# Patient Record
Sex: FEMALE | Race: BLACK OR AFRICAN AMERICAN | NOT HISPANIC OR LATINO | Employment: FULL TIME | URBAN - METROPOLITAN AREA
[De-identification: names, ages, dates, MRNs, and addresses within clinical notes are randomized per-mention and may not be internally consistent; named-entity substitution may affect disease eponyms.]

---

## 2023-06-28 ENCOUNTER — OFFICE VISIT (OUTPATIENT)
Dept: URGENT CARE | Facility: CLINIC | Age: 43
End: 2023-06-28
Payer: COMMERCIAL

## 2023-06-28 VITALS
HEART RATE: 77 BPM | SYSTOLIC BLOOD PRESSURE: 130 MMHG | DIASTOLIC BLOOD PRESSURE: 78 MMHG | WEIGHT: 290 LBS | RESPIRATION RATE: 20 BRPM | TEMPERATURE: 97 F | OXYGEN SATURATION: 98 % | HEIGHT: 67 IN | BODY MASS INDEX: 45.52 KG/M2

## 2023-06-28 DIAGNOSIS — M79.671 RIGHT FOOT PAIN: Primary | ICD-10-CM

## 2023-06-28 PROCEDURE — 96372 PR INJECTION,THERAP/PROPH/DIAG2ST, IM OR SUBCUT: ICD-10-PCS | Mod: S$GLB,,, | Performed by: NURSE PRACTITIONER

## 2023-06-28 PROCEDURE — 73630 X-RAY EXAM OF FOOT: CPT | Mod: RT,S$GLB,, | Performed by: RADIOLOGY

## 2023-06-28 PROCEDURE — 99203 OFFICE O/P NEW LOW 30 MIN: CPT | Mod: 25,S$GLB,, | Performed by: NURSE PRACTITIONER

## 2023-06-28 PROCEDURE — 99203 PR OFFICE/OUTPT VISIT, NEW, LEVL III, 30-44 MIN: ICD-10-PCS | Mod: 25,S$GLB,, | Performed by: NURSE PRACTITIONER

## 2023-06-28 PROCEDURE — 73630 XR FOOT COMPLETE 3 VIEW RIGHT: ICD-10-PCS | Mod: RT,S$GLB,, | Performed by: RADIOLOGY

## 2023-06-28 PROCEDURE — 96372 THER/PROPH/DIAG INJ SC/IM: CPT | Mod: S$GLB,,, | Performed by: NURSE PRACTITIONER

## 2023-06-28 RX ORDER — HYDROCHLOROTHIAZIDE 25 MG/1
25 TABLET ORAL
COMMUNITY
Start: 2023-06-15

## 2023-06-28 RX ORDER — GABAPENTIN 100 MG/1
100 CAPSULE ORAL 2 TIMES DAILY
COMMUNITY
Start: 2023-06-17

## 2023-06-28 RX ORDER — LOSARTAN POTASSIUM 100 MG/1
100 TABLET ORAL
COMMUNITY
Start: 2023-06-15

## 2023-06-28 RX ORDER — METHYLPREDNISOLONE 4 MG/1
TABLET ORAL
Qty: 21 EACH | Refills: 0 | Status: SHIPPED | OUTPATIENT
Start: 2023-06-28 | End: 2023-07-19

## 2023-06-28 RX ORDER — KETOROLAC TROMETHAMINE 30 MG/ML
30 INJECTION, SOLUTION INTRAMUSCULAR; INTRAVENOUS
Status: COMPLETED | OUTPATIENT
Start: 2023-06-28 | End: 2023-06-28

## 2023-06-28 RX ORDER — AMLODIPINE BESYLATE 10 MG/1
10 TABLET ORAL
COMMUNITY
Start: 2023-04-07

## 2023-06-28 RX ADMIN — KETOROLAC TROMETHAMINE 30 MG: 30 INJECTION, SOLUTION INTRAMUSCULAR; INTRAVENOUS at 09:06

## 2023-06-28 NOTE — PATIENT INSTRUCTIONS
Please drink plenty of fluids.  Please get plenty of rest.  Please return here or go to the Emergency Department for any concerns or worsening of condition.  If you were prescribed a narcotic medication, do not drive or operate heavy equipment or machinery while taking these medications.  If you were not prescribed an anti-inflammatory medication, and if you do not have any history of stomach/intestinal ulcers, or kidney disease, or are not taking a blood thinner such as Coumadin, Plavix, Pradaxa, Eloquis, or Xaralta for example, it is OK to take over the counter Ibuprofen or Advil or Motrin or Aleve as directed.  Do not take these medications on an empty stomach.  Rest, ice, compression and elevation to the affected joint or limb as needed.  Please follow up with your primary care doctor or specialist as needed.  Switch shoes to better support.    If you  smoke, please stop smoking.

## 2023-06-28 NOTE — PROGRESS NOTES
"Subjective:      Patient ID: Colleen Nagy is a 42 y.o. female.    Vitals:  height is 5' 7" (1.702 m) and weight is 131.5 kg (290 lb). Her oral temperature is 97.2 °F (36.2 °C). Her blood pressure is 130/78 and her pulse is 77. Her respiration is 20 and oxygen saturation is 98%.     Chief Complaint: Foot Injury    42 y.o female c/o right foot pain started x 2weeks ago. Patient states that she has pain on the side of foot that she unable to keep shoes on for a long period of time.Patient states that when walking she feels pain in her toes. Tylenol taking for pain. Last taking at 9 pm last night.     Foot Injury   The incident occurred more than 1 week ago. The incident occurred at home. The injury mechanism is unknown. The pain is present in the right foot. The quality of the pain is described as aching. The pain is at a severity of 5/10. The pain is moderate. The pain has been Constant since onset. Pertinent negatives include no inability to bear weight, loss of motion, loss of sensation, muscle weakness, numbness or tingling. She reports no foreign bodies present. The symptoms are aggravated by movement. She has tried acetaminophen for the symptoms. The treatment provided mild relief.     Constitution: Negative for fever.   Musculoskeletal:  Positive for pain.   Skin:  Negative for rash, wound, abrasion and erythema.   Neurological:  Negative for numbness.    Objective:     Physical Exam   Constitutional: She is oriented to person, place, and time. She appears well-developed.  Non-toxic appearance. She does not appear ill. No distress.   HENT:   Head: Normocephalic and atraumatic. Head is without abrasion, without contusion and without laceration.   Ears:   Right Ear: External ear normal.   Left Ear: External ear normal.   Nose: Nose normal.   Mouth/Throat: Oropharynx is clear and moist and mucous membranes are normal.   Eyes: Conjunctivae, EOM and lids are normal. Pupils are equal, round, and reactive to " light.   Neck: Trachea normal and phonation normal. Neck supple.   Cardiovascular: Normal rate, regular rhythm, normal heart sounds and normal pulses.   Pulmonary/Chest: Effort normal and breath sounds normal. No stridor. No respiratory distress.   Musculoskeletal: Normal range of motion.         General: Normal range of motion.      Right ankle: Normal. Achilles tendon normal.      Right foot: Normal range of motion and normal capillary refill. Tenderness present. No bony tenderness, swelling, crepitus, deformity, laceration, bunion, Charcot foot, foot drop or prominent metatarsal heads.      Comments: TTP to dorsal aspect of right foot near MTP and into the MT region of adjacent toes.  No swelling, redness, or warmth.  +2 pulses.     Neurological: She is alert and oriented to person, place, and time.   Skin: Skin is warm, dry, intact, not diaphoretic and no rash. Capillary refill takes less than 2 seconds. not right footNo abrasion, No burn, No bruising, No erythema and No ecchymosis   Psychiatric: Her speech is normal and behavior is normal. Judgment and thought content normal.   Nursing note and vitals reviewed.    Assessment:     1. Right foot pain        Plan:     No hx of gout.  No recent foods or alcohol that could cause gout.  She notes that the pain is worse when she flexes the toes.    She's tried tylenol w/ no relief.  She does not like NSAIDs.  Will try medrol dose marialuisa and changing shoes.  F/u with podiatry if symptoms persist.    XR FOOT COMPLETE 3 VIEW RIGHT    Result Date: 6/28/2023  EXAMINATION: XR FOOT COMPLETE 3 VIEW RIGHT CLINICAL HISTORY: . Pain in right foot TECHNIQUE: AP, lateral, and oblique views of the right foot were performed. COMPARISON: None FINDINGS: No evidence of acute fracture or dislocation.  Joint spaces appear maintained.  Mild DJD of the great toe MTP joint.  No evidence of radiopaque foreign body. Minor degenerative spurring dorsal midfoot.  Mild enthesopathic change at the  calcaneus.     No convincing evidence of acute fracture or dislocation. Electronically signed by: Rupert Solo Date:    06/28/2023 Time:    09:21     Right foot pain  -     XR FOOT COMPLETE 3 VIEW RIGHT; Future; Expected date: 06/28/2023  -     ketorolac injection 30 mg  -     methylPREDNISolone (MEDROL DOSEPACK) 4 mg tablet; use as directed  Dispense: 21 each; Refill: 0        Patient Instructions   Please drink plenty of fluids.  Please get plenty of rest.  Please return here or go to the Emergency Department for any concerns or worsening of condition.  If you were prescribed a narcotic medication, do not drive or operate heavy equipment or machinery while taking these medications.  If you were not prescribed an anti-inflammatory medication, and if you do not have any history of stomach/intestinal ulcers, or kidney disease, or are not taking a blood thinner such as Coumadin, Plavix, Pradaxa, Eloquis, or Xaralta for example, it is OK to take over the counter Ibuprofen or Advil or Motrin or Aleve as directed.  Do not take these medications on an empty stomach.  Rest, ice, compression and elevation to the affected joint or limb as needed.  Please follow up with your primary care doctor or specialist as needed.  Switch shoes to better support.    If you  smoke, please stop smoking.

## 2024-09-21 ENCOUNTER — OFFICE VISIT (OUTPATIENT)
Dept: URGENT CARE | Facility: CLINIC | Age: 44
End: 2024-09-21
Payer: COMMERCIAL

## 2024-09-21 VITALS
DIASTOLIC BLOOD PRESSURE: 60 MMHG | SYSTOLIC BLOOD PRESSURE: 111 MMHG | BODY MASS INDEX: 45.52 KG/M2 | OXYGEN SATURATION: 98 % | RESPIRATION RATE: 20 BRPM | HEIGHT: 67 IN | TEMPERATURE: 99 F | HEART RATE: 82 BPM | WEIGHT: 290 LBS

## 2024-09-21 DIAGNOSIS — M54.41 ACUTE RIGHT-SIDED LOW BACK PAIN WITH RIGHT-SIDED SCIATICA: Primary | ICD-10-CM

## 2024-09-21 DIAGNOSIS — M25.551 PAIN OF RIGHT HIP: ICD-10-CM

## 2024-09-21 PROCEDURE — 72100 X-RAY EXAM L-S SPINE 2/3 VWS: CPT | Mod: S$GLB,,, | Performed by: RADIOLOGY

## 2024-09-21 PROCEDURE — 96372 THER/PROPH/DIAG INJ SC/IM: CPT | Mod: S$GLB,,, | Performed by: FAMILY MEDICINE

## 2024-09-21 PROCEDURE — 99214 OFFICE O/P EST MOD 30 MIN: CPT | Mod: 25,S$GLB,, | Performed by: NURSE PRACTITIONER

## 2024-09-21 RX ORDER — KETOROLAC TROMETHAMINE 30 MG/ML
30 INJECTION, SOLUTION INTRAMUSCULAR; INTRAVENOUS
Status: COMPLETED | OUTPATIENT
Start: 2024-09-21 | End: 2024-09-21

## 2024-09-21 RX ORDER — LATANOPROST 50 UG/ML
1 SOLUTION/ DROPS OPHTHALMIC NIGHTLY
COMMUNITY
Start: 2024-07-16

## 2024-09-21 RX ORDER — METFORMIN HYDROCHLORIDE 500 MG/1
1 TABLET, EXTENDED RELEASE ORAL NIGHTLY
COMMUNITY
Start: 2024-07-30

## 2024-09-21 RX ORDER — PREDNISONE 20 MG/1
40 TABLET ORAL DAILY
Qty: 10 TABLET | Refills: 0 | Status: SHIPPED | OUTPATIENT
Start: 2024-09-21 | End: 2024-09-26

## 2024-09-21 RX ADMIN — KETOROLAC TROMETHAMINE 30 MG: 30 INJECTION, SOLUTION INTRAMUSCULAR; INTRAVENOUS at 10:09

## 2024-09-21 NOTE — PROGRESS NOTES
"Subjective:      Patient ID: Colleen Nagy is a 43 y.o. female.    Vitals:  height is 5' 7" (1.702 m) and weight is 131.5 kg (290 lb). Her oral temperature is 98.6 °F (37 °C). Her blood pressure is 111/60 and her pulse is 82. Her respiration is 20 and oxygen saturation is 98%.     Chief Complaint: Hip Pain    Pt is a 43 year old female c/o right hip pain that radiates down to leg that started 2 weeks ago.  No trauma. Patient tried OTC Tylenol, naproxen, & Biofreeze with mild relief.  She has constant pain, but it is worse w/ ROM and sitting, standing, laying down. The area is TTP.     Hip Pain   The incident occurred more than 1 week ago. The incident occurred at home. There was no injury mechanism. The pain is present in the right hip and right leg. The quality of the pain is described as shooting. The pain is at a severity of 7/10. The pain is severe. The pain has been Constant since onset. Associated symptoms include tingling. Pertinent negatives include no inability to bear weight, loss of motion, loss of sensation, muscle weakness or numbness. She reports no foreign bodies present. The symptoms are aggravated by movement and weight bearing. She has tried acetaminophen and NSAIDs for the symptoms. The treatment provided mild relief.       Neurological:  Negative for numbness.      Objective:     Physical Exam   Constitutional: She is oriented to person, place, and time.   HENT:   Head: Normocephalic.   Ears:   Right Ear: External ear normal.   Left Ear: External ear normal.   Nose: Nose normal.   Mouth/Throat: Mucous membranes are moist.   Eyes: Conjunctivae are normal.   Cardiovascular: Normal rate.   Pulmonary/Chest: Effort normal.   Musculoskeletal:      Lumbar back: She exhibits decreased range of motion and tenderness. She exhibits no swelling.        Back:    Neurological: She is alert and oriented to person, place, and time.   Skin: Skin is dry.   Psychiatric: Her behavior is normal.   Nursing " note and vitals reviewed.      Assessment:     1. Acute right-sided low back pain with right-sided sciatica    2. Pain of right hip        Plan:       Acute right-sided low back pain with right-sided sciatica  -     ketorolac injection 30 mg  -     predniSONE (DELTASONE) 20 MG tablet; Take 2 tablets (40 mg total) by mouth once daily. for 5 days  Dispense: 10 tablet; Refill: 0  -     Ambulatory referral/consult to Orthopedics  -     XR LUMBAR SPINE 2 OR 3 VIEWS; Future; Expected date: 09/21/2024    Pain of right hip  -     Ambulatory referral/consult to Orthopedics  -     XR LUMBAR SPINE 2 OR 3 VIEWS; Future; Expected date: 09/21/2024      Patient Instructions   - You must understand that you have received an Urgent Care treatment only and that you may be released before all of your medical problems are known or treated.   - You, the patient, will arrange for follow up care as instructed.   - If your condition worsens or fails to improve we recommend that you receive another evaluation at the ER immediately or contact your PCP to discuss your concerns.   - You can call [phone] or [phone] to help schedule an appointment with the appropriate provider.    Take OTC ibuprofen 400-800 mg 3 times per day. Max dose 3200 mg from all sources per day. Or Tylenol 500-1000 mg 3 times per day. Max 4000 mg from all sources per day.   Rest as much as possible  Alternate heat and ice. Apply heat for 20-30 minutes, perform gentle range of motion exercises, and then apply ice for 15 minutes. Do this 3-4 times per day.    If you received an injection of toradol in the clinic today, DO NOT take any anti-inflammatory medications today. These include: Advil/Motrin (ibuprofen), Aleve (naproxen), Mobic (meloxicam).

## 2024-09-21 NOTE — PATIENT INSTRUCTIONS
- You must understand that you have received an Urgent Care treatment only and that you may be released before all of your medical problems are known or treated.   - You, the patient, will arrange for follow up care as instructed.   - If your condition worsens or fails to improve we recommend that you receive another evaluation at the ER immediately or contact your PCP to discuss your concerns.   - You can call [phone] or [phone] to help schedule an appointment with the appropriate provider.    Take OTC ibuprofen 400-800 mg 3 times per day. Max dose 3200 mg from all sources per day. Or Tylenol 500-1000 mg 3 times per day. Max 4000 mg from all sources per day.   Rest as much as possible  Alternate heat and ice. Apply heat for 20-30 minutes, perform gentle range of motion exercises, and then apply ice for 15 minutes. Do this 3-4 times per day.    If you received an injection of toradol in the clinic today, DO NOT take any anti-inflammatory medications today. These include: Advil/Motrin (ibuprofen), Aleve (naproxen), Mobic (meloxicam).

## 2024-09-23 ENCOUNTER — OFFICE VISIT (OUTPATIENT)
Dept: URGENT CARE | Facility: CLINIC | Age: 44
End: 2024-09-23
Payer: COMMERCIAL

## 2024-09-23 ENCOUNTER — TELEPHONE (OUTPATIENT)
Dept: SPORTS MEDICINE | Facility: CLINIC | Age: 44
End: 2024-09-23
Payer: COMMERCIAL

## 2024-09-23 VITALS
RESPIRATION RATE: 20 BRPM | HEART RATE: 90 BPM | WEIGHT: 290 LBS | HEIGHT: 67 IN | BODY MASS INDEX: 45.52 KG/M2 | DIASTOLIC BLOOD PRESSURE: 90 MMHG | SYSTOLIC BLOOD PRESSURE: 141 MMHG | TEMPERATURE: 100 F | OXYGEN SATURATION: 97 %

## 2024-09-23 DIAGNOSIS — S20.219A CONTUSION OF CHEST WALL, UNSPECIFIED LATERALITY, INITIAL ENCOUNTER: Primary | ICD-10-CM
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
## 2024-09-23 PROCEDURE — 93005 ELECTROCARDIOGRAM TRACING: CPT | Mod: S$GLB,,, | Performed by: FAMILY MEDICINE

## 2024-09-23 PROCEDURE — 93010 ELECTROCARDIOGRAM REPORT: CPT | Mod: S$GLB,,, | Performed by: INTERNAL MEDICINE

## 2024-09-23 PROCEDURE — 96372 THER/PROPH/DIAG INJ SC/IM: CPT | Mod: S$GLB,,, | Performed by: FAMILY MEDICINE

## 2024-09-23 PROCEDURE — 99213 OFFICE O/P EST LOW 20 MIN: CPT | Mod: 25,S$GLB,, | Performed by: FAMILY MEDICINE

## 2024-09-23 PROCEDURE — 71046 X-RAY EXAM CHEST 2 VIEWS: CPT | Mod: S$GLB,,, | Performed by: RADIOLOGY

## 2024-09-23 RX ORDER — IBUPROFEN 600 MG/1
600 TABLET ORAL EVERY 8 HOURS PRN
Qty: 30 TABLET | Refills: 0 | Status: SHIPPED | OUTPATIENT
Start: 2024-09-23

## 2024-09-23 RX ORDER — KETOROLAC TROMETHAMINE 30 MG/ML
30 INJECTION, SOLUTION INTRAMUSCULAR; INTRAVENOUS
Status: COMPLETED | OUTPATIENT
Start: 2024-09-23 | End: 2024-09-23

## 2024-09-23 RX ADMIN — KETOROLAC TROMETHAMINE 30 MG: 30 INJECTION, SOLUTION INTRAMUSCULAR; INTRAVENOUS at 07:09

## 2024-09-23 NOTE — TELEPHONE ENCOUNTER
Called pt regarding appt scheduled on 9/24, left VM requesting return phone call to reschedule her to see a provider in [facility].     Roseanne Bates MS, OTC  Clinical Assistant to Dr. Eden Levy
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
## 2024-09-24 LAB
OHS QRS DURATION: 72 MS
OHS QTC CALCULATION: 430 MS

## 2024-09-26 ENCOUNTER — OFFICE VISIT (OUTPATIENT)
Dept: SPINE | Facility: CLINIC | Age: 44
End: 2024-09-26
Payer: COMMERCIAL

## 2024-09-26 VITALS
HEIGHT: 67 IN | BODY MASS INDEX: 45.52 KG/M2 | DIASTOLIC BLOOD PRESSURE: 59 MMHG | HEART RATE: 80 BPM | WEIGHT: 290 LBS | SYSTOLIC BLOOD PRESSURE: 119 MMHG | RESPIRATION RATE: 18 BRPM

## 2024-09-26 DIAGNOSIS — M79.10 MYALGIA: ICD-10-CM

## 2024-09-26 DIAGNOSIS — M79.18 MYOFASCIAL PAIN SYNDROME: ICD-10-CM

## 2024-09-26 DIAGNOSIS — G57.01 PIRIFORMIS SYNDROME OF RIGHT SIDE: Primary | ICD-10-CM

## 2024-09-26 PROCEDURE — 99204 OFFICE O/P NEW MOD 45 MIN: CPT | Mod: S$GLB,,, | Performed by: ANESTHESIOLOGY

## 2024-09-26 PROCEDURE — 3074F SYST BP LT 130 MM HG: CPT | Mod: CPTII,S$GLB,, | Performed by: ANESTHESIOLOGY

## 2024-09-26 PROCEDURE — 3078F DIAST BP <80 MM HG: CPT | Mod: CPTII,S$GLB,, | Performed by: ANESTHESIOLOGY

## 2024-09-26 PROCEDURE — 4010F ACE/ARB THERAPY RXD/TAKEN: CPT | Mod: CPTII,S$GLB,, | Performed by: ANESTHESIOLOGY

## 2024-09-26 PROCEDURE — 1159F MED LIST DOCD IN RCRD: CPT | Mod: CPTII,S$GLB,, | Performed by: ANESTHESIOLOGY

## 2024-09-26 PROCEDURE — 99999 PR PBB SHADOW E&M-EST. PATIENT-LVL IV: CPT | Mod: PBBFAC,,, | Performed by: ANESTHESIOLOGY

## 2024-09-26 PROCEDURE — 3008F BODY MASS INDEX DOCD: CPT | Mod: CPTII,S$GLB,, | Performed by: ANESTHESIOLOGY

## 2024-09-26 NOTE — PROGRESS NOTES
[facility] - New Patient    PCP: Marva Arvizu MD    REFERRING PHYSICIAN: Eden Tarango NP    CHIEF COMPLAINT: Chronic low back pain    Original HISTORY OF PRESENT ILLNESS: Colleen Nagy presents to the clinic for the evaluation of the above pain. The pain started around 2-3 weeks ago without any specific inciting nor traumatic event. Pt presented to urgent care for this pain on , where she received short course Rx of prednisone and was treated with toradol injection. After this, pt reports     Original Pain Description:  The pain is predominantly located in the right gluteal region. The pain is described as sharp, shooting, and tingling. Exacerbating factors: Standing and Walking. Mitigating factors medications. Symptoms interfere with daily activity, sleeping, and work. The patient feels like symptoms have been unchanged. Patient denies night fever/night sweats, urinary incontinence, bowel incontinence, significant weight loss, significant motor weakness, and loss of sensations.      Original PAIN SCORES:  Best: Pain is 5  Worst: Pain is 10  Current: Pain is 6         No data to display                INTERVAL HISTORY: (Newest visit at the bottom)   Interval History (Date):       6 weeks of Conservative therapy: (Must include dates)  PT:   Chiro: None.   HEP: None.       Treatments / Medications: (Ice/Heat/NSAIDS/APAP/etc):  Tylenol   Naproxen - decent benefit. Helps w/ sleep.   Biofreeze      Interventional Pain Procedures: (Previous injections)  None.     Past Medical History:   Diagnosis Date    Abnormal ECG 2014: EKG: T wave inversion V1-V3 with PRWP. 2014: EKG: Normal.    Chest pain 2014: Chest pain started.     Hypertension     Pre-operative cardiovascular examination 2014: Plan is laparoscopic surgery.     Past Surgical History:   Procedure Laterality Date     SECTION      Endometriosis       Social  History     Socioeconomic History    Marital status: Single   Tobacco Use    Smoking status: Never    Smokeless tobacco: Never   Substance and Sexual Activity    Alcohol use: No    Drug use: Never     Social Determinants of Health     Financial Resource Strain: Low Risk  (7/30/2024)    Received from [facility]    Overall Financial Resource Strain (CARDIA)     Difficulty of Paying Living Expenses: Not very hard   Food Insecurity: Food Insecurity Present (7/30/2024)    Received from [facility]    Hunger Vital Sign     Worried About Running Out of Food in the Last Year: Never true     Ran Out of Food in the Last Year: Sometimes true   Transportation Needs: No Transportation Needs (7/30/2024)    Received from [facility]    PRAPARE - Transportation     Lack of Transportation (Medical): No     Lack of Transportation (Non-Medical): No   Physical Activity: Insufficiently Active (7/30/2024)    Received from [facility]    Exercise Vital Sign     Days of Exercise per Week: 3 days     Minutes of Exercise per Session: 20 min   Stress: Stress Concern Present (7/30/2024)    Received from [facility]    [employer] [place] [employer] - Occupational Stress Questionnaire     Feeling of Stress : To some extent     No family history on file.    Review of patient's allergies indicates:  No Known Allergies    Current Outpatient Medications   Medication Sig    amLODIPine (NORVASC) 10 MG tablet Take 10 mg by mouth.    gabapentin (NEURONTIN) 100 MG capsule Take 100 mg by mouth 2 (two) times daily.    hydroCHLOROthiazide (HYDRODIURIL) 25 MG tablet Take 25 mg by mouth.    ibuprofen (ADVIL,MOTRIN) 600 MG tablet Take 1 tablet (600 mg total) by mouth every 8 (eight) hours as needed for Pain (with food).    latanoprost 0.005 % ophthalmic solution Place 1 drop into the right eye every evening.    losartan (COZAAR) 100 MG tablet Take 100 mg by mouth.    metFORMIN (GLUCOPHAGE-XR) 500 MG ER 24hr tablet Take 1 tablet by mouth every  "evening.    naproxen sodium (ANAPROX) 550 MG tablet Take 550 mg by mouth every 12 (twelve) hours.    predniSONE (DELTASONE) 20 MG tablet Take 2 tablets (40 mg total) by mouth once daily. for 5 days (Patient not taking: Reported on 9/26/2024)     No current facility-administered medications for this visit.       ROS:  GENERAL: No fever. No chills. No fatigue. Denies weight loss. Denies weight gain.  HEENT: Denies headaches. Denies vision change. Denies eye pain. Denies double vision. Denies ear pain.   CV: Denies chest pain.   PULM: Denies of shortness of breath.  GI: Denies constipation. No diarrhea. No abdominal pain. Denies nausea. Denies vomiting. No blood in stool.  HEME: Denies bleeding problems.  : Denies urgency. No painful urination. No blood in urine.  MS: Denies joint stiffness. Denies joint swelling.  +Back pain  SKIN: Denies rash.   NEURO: Denies seizures. No weakness.  PSYCH:  Denies difficulty sleeping. No anxiety. Denies depression. No suicidal thoughts.       VITALS:   Vitals:    09/26/24 1451   BP: (!) 119/59   Pulse: 80   Resp: 18   Weight: 131.5 kg (290 lb)   Height: 5' 7" (1.702 m)   PainSc:   7   PainLoc: Back         PHYSICAL EXAM:   GENERAL: Well appearing, in no acute distress, alert and oriented x3.  PSYCH:  Mood and affect appropriate.  SKIN: Skin color, texture, turgor normal, no rashes or lesions.  HEENT:  Normocephalic, atraumatic. Cranial nerves grossly intact.  PULM: No evidence of respiratory difficulty, symmetric chest rise.  GI:  Non-distended  BACK: Limited ROM primarily due to body habitus. Pain worse with extension. +TTP exquisitely over the right piriformis muscle. No pain to palpation over the spinous processes. No pain to palpation over facet joints. There is no pain with palpation over the sacroiliac joints bilaterally. No TTP over the GTB BL.. Mild pain over the IT band. Negative facet loading. FADIR test on right reproduced pain over the piriformis.   EXTREMITIES: No " deformities, edema, or skin discoloration.   MUSCULOSKELETAL: Shoulder, hip, and knee provocative maneuvers are negative. No atrophy is noted.  NEURO: Sensation is equal and appropriate bilaterally. Bilateral upper and lower extremity strength is normal and symmetric. Bilateral upper and lower extremity coordination and muscle stretch reflexes are physiologic and symmetric. Plantar response are downgoing. Straight leg raising in the supine position is negative to radicular pain.   GAIT: normal.      LABS:  No labs to review.     IMAGING:      XR L Spine 9/21/2024  FINDINGS:  There appear to be 5 non-rib-bearing lumbar type vertebra.     No convincing evidence of acute displaced fracture or traumatic subluxation.     Vertebral body heights and disc spaces appear maintained.     No acute findings in the visualized portions of the abdomen or pelvis.     No evidence of radiopaque foreign body.     Impression:     No convincing evidence of acute displaced fracture or traumatic subluxation.        ASSESSMENT: 43 y.o. year old female with pain, consistent with:    Encounter Diagnoses   Name Primary?    Piriformis syndrome of right side Yes    Myalgia     Myofascial pain syndrome        DISCUSSION: Colleen Nagy is a pleasant woman who comes to us with right hip and leg pain which is worst with activity. Exam shows pain reproduced with palpation of the piriformis. No specific inciting event. She presented to urgent care recently, managed with toradol shot and short course of prednisone without benefit..       PLAN:  Provided pt with HEP, pt expressed understanding and ability to perform exercises.   Referral placed to physical therapy.  Recommend daily heat therapy with heat pad or warm water.   Recommend daily massage therapy with home handheld massager directed over the pirformis muscle.   RTC in 6 weeks. If limited relief with above strategies, consider Piriformis injection with fluoro.       I would like to  thank Eden Tarango, CHANDLER for the opportunity to assist in the care of this patient. We had a very nice visit and I look forward to continuing their care. Please let me know if I can be of further assistance.     Brent Freitas M.D.  PGY-5  Pain Fellow  09/26/2024